# Patient Record
(demographics unavailable — no encounter records)

---

## 2024-10-21 NOTE — REASON FOR VISIT
[Follow-Up] : a follow-up visit [Home] : at home, [unfilled] , at the time of the visit. [Medical Office: ([facility])___] : at the medical office located in  [Patient] : the patient [Self] : self [This encounter was initiated by telehealth (audio with video) and converted to telephone (audio only) due to technical difficulties.] : This encounter was initiated by telehealth (audio with video) and converted to telephone (audio only) due to technical difficulties.

## 2024-10-21 NOTE — ASSESSMENT
[FreeTextEntry1] :  Baratric surgery history: none  Obesity co-morbities: HTN, HLD, GERD, Arthritis  Comorbidities improved or resolved:   Anti-obesity Medications: Bupropion  Obesity medication side effects: none . 77 year old female w/class 1 obesity now overweight status, htn, hld, GERD, arthritis presents for obesity medicine follow up visit.   Plan:.  - whole food eating approach-plant forward. Limit added fat/refined carbs. 3 meals/day. Reduce nighttime snacking, recognize food triggers.  -Adequate hydration. Now limiting alcohol, likely helping with wt loss.  -continue Bupropion 300 mg daily.  -food journal/mindful eating/stress management -continue regular physical activity>increase walking as tolerated -eventual TKR for arthritis but symptoms improved w/wt loss so will hold off for now.    RTC in 2 months

## 2024-11-08 NOTE — ASSESSMENT
[FreeTextEntry1] : Ms. ESPINOSA is a 77 year old female with a history of anxiety/depression, arthritis, hyperlipidemia, HTN, mitral valve prolapse, former 30+ pack year smoker- abnormal CT of chest (Stable study since 2017) -stable - Her #1 issue is arthritis (Still)- s/p Fx rib left- stable/thriving with weight loss (35 lbs)  Problem 1: abnormal CT of chest ( 7 x 6 mm nodule - indeterminate) likely benign since 2017  - ?CA, ?Old tuberculosis, ?old scar from chicken pox / measles , ?other benign abnormalities  - follow up CT 4/2025 -not a Nodify candidate  - s/p blood work: Quantiferon Gold, Hypersensitivity panel, ACE level (wnl) CAT scans are the only radiological modality to identify abnormalities w/in the lungs with regards to nodules/masses/lymph nodes. Risks, benefits were reviewed in detail. The guidelines for abnormalities include follow up CT scans at various intervals which could range from 6 weeks to 1 year intervals. If there is a change for the worse then consideration for a biopsy will be considered if you are a candidate. Second opinion evaluation with a thoracic surgeon or an interventional radiologist could be offered.   Problem 2: Lung CA screening  - s/p CT scan - 4/2023; 4/2024- next 4/2025 Lung cancer screening is recommended for people between the ages of 55 and 80 with prior 30+ pack year smoking histories. There is irrefutable evidence for realization of lung cancer screening based on two large randomized control trials demonstrating relative reduction in lung cancer mortality for patients undergoing low-dose CT scanning. Risks and benefits reviewed with the patient.  Problem 3: Cardiac  -continue following up with Dr. Howell al.  Problem 4: abnormal PFTs (resolved) - s/p full PFTs- wnl   Problem 5: Out of shape/overweight (improved) -recommended internet exercise platforms: SafetyTat and Aerobic exercise for seniors  -Weight loss, exercise, and diet control were discussed and are highly encouraged. Treatment options were given such as, aqua therapy, and contacting a nutritionist. Recommended to use the elliptical, stationary bike, less use of treadmill. .   Problem 6: Health Maintenance/COVID19 Precautions: - S/p Covid 19 vaccine (Moderna) x3 - Clean your hands often. Wash your hands often with soap and water for at least 20 seconds, especially after blowing your nose, coughing, or sneezing, or having been in a public place. - If soap and water are not available, use a hand  that contains at least 60% alcohol. - To the extent possible, avoid touching high-touch surfaces in public places - elevator buttons, door handles, handrails, handshaking with people, etc. Use a tissue or your sleeve to cover your hand or finger if you must touch something. - Wash your hands after touching surfaces in public places. - Avoid touching your face, nose, eyes, etc. - Clean and disinfect your home to remove germs: practice routine cleaning of frequently touched surfaces (for example: tables, doorknobs, light switches, handles, desks, toilets, faucets, sinks & cell phones) - Avoid crowds, especially in poorly ventilated spaces. Your risk of exposure to respiratory viruses like COVID-19 may increase in crowded, closed-in settings with little air circulation if there are people in the crowd who are sick. All patients are recommended to practice social distancing and stay at least 6 feet away from others. - Avoid all non-essential travel including plane trips, and especially avoid embarking on cruise ships. -If COVID-19 is spreading in your community, take extra measures to put distance between yourself and other people to further reduce your risk of being exposed to this new virus. -Stay home as much as possible. - Consider ways of getting food brought to your house through family, social, or commercial networks -Be aware that the virus has been known to live in the air up to 3 hours post exposure, cardboard up to 24 hours post exposure, copper up to 4 hours post exposure, steel and plastic up to 2-3 days post exposure. Risk of transmission from these surfaces are affected by many variables. Immune Support Recommendations: -OTC Vitamin C 500mg BID  -OTC Quercetin 250-500mg BID  -OTC Zinc 75-100mg per day  -OTC Melatonin 1 or 2 mg a night  -OTC Vitamin D 1-4000mg per day  -OTC Tonic Water 8oz per day Asthma and COVID19: You need to make sure your asthma is under control. This often requires the use of inhaled corticosteroids (and sometimes oral corticosteroids). Inhaled corticosteroids do not likely reduce your immune system's ability to fight infections, but oral corticosteroids may. It is important to use the steps above to protect yourself to limit your exposure to any respiratory virus.  Problem 7: health maintenance -s/p influenza vaccine 2024 -recommended strep pneumonia vaccines after age 65: Prevnar-13 vaccine (completed), followed by Pneumo vaccine 23 one year following (completed) -recommended early intervention for URIs -recommended regular osteoporosis evaluations -recommended early dermatological evaluations -recommended after the age of 50 to the age of 70, colonoscopy every 5 years   Follow up in 3-4 months  -She is recommended to call with any changes, questions, or concerns.

## 2024-11-08 NOTE — HISTORY OF PRESENT ILLNESS
[TextBox_4] : Ms. ESPINOSA is a 77-year-old female presenting to the office today for a follow-up pulmonary evaluation. Her chief complaint is  -she notes losing weight (~35 lbs) -she notes knee pains despite weight loss. Dr. Maya said she can have surgery when she's ready -she notes arthritis in her feet and back pain. Her rheumatologist instructed her to continue taking Advil -she notes s/p EgD and colonoscopy 8/2024 due to dysphagia and drooling -she notes she was given Pantoprazole, and her Sx resolved. She now takes Pantoprazole intermittently -she notes that, when she got home from the EgD, she had significant soreness and marks on her neck and cheek. -she was told that, during the EgD, she had SOB due to having a small neck -she notes frequently waking up due to nocturia, with no issues falling back asleep -she notes exercising at the [facility] 4X per week  -she denies any headaches, nausea, emesis, fever, chills, sweats, chest pain, chest pressure, coughing, wheezing, palpitations, diarrhea, constipation, vertigo, myalgias, leg swelling, itchy eyes, itchy ears, heartburn, reflux, or sour taste in the mouth.

## 2024-11-08 NOTE — ADDENDUM
[FreeTextEntry1] : Documented by Karen Campbell acting as a scribe for Dr. Eddie Saunders on 11/08/2024. All medical record entries made by the Scribe were at my, Dr. Eddie Saunders's, direction and personally dictated by me on 11/08/2024. I have reviewed the chart and agree that the record accurately reflects my personal performance of the history, physical exam, assessment and plan. I have also personally directed, reviewed, and agree with the discharge instructions.

## 2024-11-08 NOTE — PHYSICAL EXAM
[No Acute Distress] : no acute distress [Normal Oropharynx] : normal oropharynx [III] : Mallampati Class: III [Normal Appearance] : normal appearance [No Neck Mass] : no neck mass [Normal Rate/Rhythm] : normal rate/rhythm [Normal S1, S2] : normal s1, s2 [No Murmurs] : no murmurs [No Resp Distress] : no resp distress [Clear to Auscultation Bilaterally] : clear to auscultation bilaterally [No Abnormalities] : no abnormalities [Benign] : benign [No Clubbing] : no clubbing [Normal Gait] : normal gait [No Cyanosis] : no cyanosis [No Edema] : no edema [FROM] : FROM [Normal Color/ Pigmentation] : normal color/ pigmentation [No Focal Deficits] : no focal deficits [Oriented x3] : oriented x3 [Normal Affect] : normal affect [TextBox_68] : I:E ratio 1:3; clear

## 2024-11-08 NOTE — PROCEDURE
[FreeTextEntry1] : PFTs revealed mild restrictive dysfunction; FEV1 was 1.67L, which is 65.7% of predicted; normal flow volume loop. PFTs were performed to evaluate for abnormal CT  FENO was 20; a normal value being less than 25 Fractional exhaled nitric oxide (FENO) is regarded as a simple, noninvasive method for assessing eosinophilic airway inflammation. Produced by a variety of cells within the lung, nitric oxide (NO) concentrations are generally low in healthy individuals. However, high concentrations of NO appear to be involved in nonspecific host defense mechanisms and chronic inflammatory diseases such as asthma. The American Thoracic Society (ATS) therefore has recommended using FENO to aid in the diagnosis and monitoring of eosinophilic airway inflammation and asthma, and for identifying steroid responsive individuals whose chronic respiratory symptoms may be caused by airway inflammation.

## 2024-12-05 NOTE — HEALTH RISK ASSESSMENT
[Very Good] : ~his/her~  mood as very good [Yes] : Yes [4 or more  times a week (4 pts)] : 4 or more  times a week (4 points) [1 or 2 (0 pts)] : 1 or 2 (0 points) [Never (0 pts)] : Never (0 points) [No] : In the past 12 months have you used drugs other than those required for medical reasons? No [Former] : Former [20 or more] : 20 or more [> 15 Years] : > 15 Years [NO] : No [Patient reported mammogram was normal] : Patient reported mammogram was normal [Patient reported PAP Smear was normal] : Patient reported PAP Smear was normal [Patient reported bone density results were abnormal] : Patient reported bone density results were abnormal [Patient reported colonoscopy was normal] : Patient reported colonoscopy was normal [HIV test declined] : HIV test declined [Hepatitis C test declined] : Hepatitis C test declined [None] : None [With Family] : lives with family [Retired] : retired [College] : College [] :  [# Of Children ___] : has [unfilled] children [Feels Safe at Home] : Feels safe at home [Fully functional (bathing, dressing, toileting, transferring, walking, feeding)] : Fully functional (bathing, dressing, toileting, transferring, walking, feeding) [Fully functional (using the telephone, shopping, preparing meals, housekeeping, doing laundry, using] : Fully functional and needs no help or supervision to perform IADLs (using the telephone, shopping, preparing meals, housekeeping, doing laundry, using transportation, managing medications and managing finances) [Smoke Detector] : smoke detector [Carbon Monoxide Detector] : carbon monoxide detector [Safety elements used in home] : safety elements used in home [Seat Belt] :  uses seat belt [Sunscreen] : uses sunscreen [With Patient/Caregiver] : , with patient/caregiver [Reviewed no changes] : Reviewed, no changes [Name: ___] : Health Care Proxy's Name: [unfilled]  [Relationship: ___] : Relationship: [unfilled] [Aggressive treatment] : aggressive treatment [Audit-CScore] : 4 [Change in mental status noted] : No change in mental status noted [Language] : denies difficulty with language [Behavior] : denies difficulty with behavior [Learning/Retaining New Information] : denies difficulty learning/retaining new information [Handling Complex Tasks] : denies difficulty handling complex tasks [Reasoning] : denies difficulty with reasoning [Spatial Ability and Orientation] : denies difficulty with spatial ability and orientation [Sexually Active] : not sexually active [High Risk Behavior] : no high risk behavior [Reports changes in hearing] : Reports no changes in hearing [Reports changes in vision] : Reports no changes in vision [Reports changes in dental health] : Reports no changes in dental health [MammogramDate] : 10/2024 [MammogramComments] : gyn follow up [PapSmearDate] : 01/2024 [PapSmearComments] : gyn follow up [BoneDensityDate] : 09/2023 [BoneDensityComments] : osteopenia, vit d/calcium advised [ColonoscopyDate] : 08/2023 [ColonoscopyComments] : due in 2028.  [de-identified] : dentist twice a year [AdvancecareDate] : 12/05/2024

## 2024-12-05 NOTE — ASSESSMENT
[FreeTextEntry1] : completed physical exam, blood work and urinalysis ordered today, will follow up accordingly. HCM: up to date vaccines up to date, Tdap deferred vitals stable, ECG with cardiology no acute health cocerns medication refill sent.

## 2024-12-05 NOTE — HISTORY OF PRESENT ILLNESS
[FreeTextEntry1] : comprehensive visit [de-identified] : Ms. Claudia Suazo is a 76 yo female presents today for annual comprehensive and labs. Pt reports today, overall is well, no acute new health concerns today.

## 2024-12-05 NOTE — PHYSICAL EXAM
[No Acute Distress] : no acute distress [Well Nourished] : well nourished [Supple] : supple [Clear to Auscultation] : lungs were clear to auscultation bilaterally [Normal S1, S2] : normal S1 and S2 [No Edema] : there was no peripheral edema [Non Tender] : non-tender [No CVA Tenderness] : no CVA  tenderness [No Spinal Tenderness] : no spinal tenderness [No Rash] : no rash [Normal Gait] : normal gait [Normal Affect] : the affect was normal [de-identified] : obese

## 2024-12-16 NOTE — HISTORY OF PRESENT ILLNESS
[Improved Health] : Improved health [Improved Mobility] : Improved mobility [FreeTextEntry1] : 77 year old female w/htn, hld, GERD, arthritis presents obesity medicine follow up.   Weight loss hx: Gradual weight gain over time. Has had some success in past w/WW but does not enjoy group sessions.   Current weight: 188 lbs (down 37 lbs in 9 months>nearly 20% of BW) Max weight: 230lbs.  Ht:5' 9''   Goals: Originally under 200s now goal is 180mg/dl. Feel better/improve health. Needs bilat TKR eventually   Interim hx: -wt stable since last visit.  -happy that she has been able to maintain as she has not been restricting during holiday season -more lunches and get togethers recently.  -has Moh's surgery, couldn't exercise for 2 weeks but now restarted and has increased to 5 days/week and really feels well with this.   -wants to get down a few more lbs after the holidays if she can.  -On Bupropion 300mg. Tolerating well/w/no side effects. -ultimately wants to get below 180lbs.       Overall pleased w/progress and remains motivated.

## 2024-12-16 NOTE — ASSESSMENT
[FreeTextEntry1] :  Baratric surgery history: none  Obesity co-morbities: HTN, HLD, GERD, Arthritis  Comorbidities improved or resolved:   Anti-obesity Medications: Bupropion  Obesity medication side effects: none . 77 year old female w/class 1 obesity now overweight status, htn, hld, GERD, arthritis presents for obesity medicine follow up visit.   Plan:.  - whole food eating approach-plant forward. Limit added fat/refined carbs. 3 meals/day. Reduce nighttime snacking, recognize food triggers.  -Adequate hydration. Now limiting alcohol, likely helping with wt loss.  -continue Bupropion 300 mg daily.  -food journal/mindful eating/stress management -continue regular [facility]  -eventual TKR for arthritis but symptoms improved w/wt loss so will hold off for now.    RTC in 2 months

## 2024-12-16 NOTE — REASON FOR VISIT
[Follow-Up] : a follow-up visit [Home] : at home, [unfilled] , at the time of the visit. [Medical Office: ([facility])___] : at the medical office located in  [Patient] : the patient [Self] : self [This encounter was initiated by telehealth (audio with video) and converted to telephone (audio only) due to technical difficulties.] : This encounter was initiated by telehealth (audio with video) and converted to telephone (audio only) due to technical difficulties.

## 2025-02-05 NOTE — PHYSICAL EXAM
[de-identified] : Constitutional: Well nourished , well developed and in no acute distress Psychiatric: Alert and oriented to time place and person.Appropriate affect . Skin: Head, neck, arms and lower extremities:no lesions or discoloration HEENT: Normocephalic, EOM intact, Nasal septum midline, Respiratory: Unlabored respirations,no audible wheezing ,no tachypnea, no cyanosis Cardiovascular: No leg swelling no ankle edema no JVD, pulse regular Vascular: No calf or thigh tenderness, Peripheral pulses: intact Lymphatics: No groin adenopathy,no lymphedema lower or upper extremities     o Left Hip Exam:   Inspection/Palpation : no tenderness, no swelling, no deformity Leg Lengths: equal Passive Range of Motion: flexion 105 degrees, internal 10 degrees with pain, external 70 degrees, abduction 45  degrees, adduction 30   degrees Strength: resisted hip flexion 5/5, resisted hip abduction 5/5 Motor Strength: Peroneals, EHL, and tibialis anterior 5/5 Reflexes: Patella 3+ R 2+L, Achilles 2+ R=L Skin: no erythema, no ecchymosis Sensation: sensation to light touch intact Vascular Exam: no edema, no cyanosis, dorsalis pedis artery pulse 2+, posterior tibial artery pulse 2+ [de-identified] : AP Pelvis and AP/Lateral views of the left hip taken today 02/05/2025 demonstrate joint space maintained.

## 2025-02-05 NOTE — HISTORY OF PRESENT ILLNESS
[de-identified] : KORY ESPINOSA is a 77-year-old female presenting to the office complaining of left hip pain. Patient presents ambulating independently. Patient reports pain for 1 month. Denies any trauma or injury. Patient complains of intermittent pain on the lateral aspect of her hip. Denies any groin or thigh pain. Provocation of pain by going up the stairs, twisting and putting on her shoes.  Denies any numbness or tingling of the lower extremities. She reports chronic episodic low-grade lumbar spine pain. Of note patient has a history of right hip bursitis, unsure of how it was treated. She is planning on TKR and wants to ensure the hip is okay prior to this.

## 2025-02-05 NOTE — DISCUSSION/SUMMARY
[de-identified] : 77-year-old female with early left hip OA. We discussed at length the nature of the patient's condition. We discussed all options and conservative measures, such as ice, NSAIDs, physical therapy, exercise limitations, and injections.   Follow up as needed   The patient understood and verbally agreed to the treatment plan. All of their questions were answered. The patient should call the office if they have any questions or experience worsening symptoms.

## 2025-02-05 NOTE — HISTORY OF PRESENT ILLNESS
[de-identified] : KORY ESPINOSA is a 77-year-old female presenting to the office complaining of left hip pain. Patient presents ambulating independently. Patient reports pain for 1 month. Denies any trauma or injury. Patient complains of intermittent pain on the lateral aspect of her hip. Denies any groin or thigh pain. Provocation of pain by going up the stairs, twisting and putting on her shoes.  Denies any numbness or tingling of the lower extremities. She reports chronic episodic low-grade lumbar spine pain. Of note patient has a history of right hip bursitis, unsure of how it was treated. She is planning on TKR and wants to ensure the hip is okay prior to this.

## 2025-02-05 NOTE — PHYSICAL EXAM
[de-identified] : Constitutional: Well nourished , well developed and in no acute distress Psychiatric: Alert and oriented to time place and person.Appropriate affect . Skin: Head, neck, arms and lower extremities:no lesions or discoloration HEENT: Normocephalic, EOM intact, Nasal septum midline, Respiratory: Unlabored respirations,no audible wheezing ,no tachypnea, no cyanosis Cardiovascular: No leg swelling no ankle edema no JVD, pulse regular Vascular: No calf or thigh tenderness, Peripheral pulses: intact Lymphatics: No groin adenopathy,no lymphedema lower or upper extremities     o Left Hip Exam:   Inspection/Palpation : no tenderness, no swelling, no deformity Leg Lengths: equal Passive Range of Motion: flexion 105 degrees, internal 10 degrees with pain, external 70 degrees, abduction 45  degrees, adduction 30   degrees Strength: resisted hip flexion 5/5, resisted hip abduction 5/5 Motor Strength: Peroneals, EHL, and tibialis anterior 5/5 Reflexes: Patella 3+ R 2+L, Achilles 2+ R=L Skin: no erythema, no ecchymosis Sensation: sensation to light touch intact Vascular Exam: no edema, no cyanosis, dorsalis pedis artery pulse 2+, posterior tibial artery pulse 2+ [de-identified] : AP Pelvis and AP/Lateral views of the left hip taken today 02/05/2025 demonstrate joint space maintained.

## 2025-02-05 NOTE — ADDENDUM
[FreeTextEntry1] : This note was written by Emy Giron on 02/05/2025 acting solely as a scribe for Dr. Seven Carson.    All medical record entries made by the Scribe were at my, Dr. Seven Carson, direction and personally dictated by me on 02/05/2025. I have personally reviewed the chart and agree that the record accurately reflects my personal performance of the history, physical exam, assessment and plan.

## 2025-02-05 NOTE — DISCUSSION/SUMMARY
[de-identified] : 77-year-old female with early left hip OA. We discussed at length the nature of the patient's condition. We discussed all options and conservative measures, such as ice, NSAIDs, physical therapy, exercise limitations, and injections.   Follow up as needed   The patient understood and verbally agreed to the treatment plan. All of their questions were answered. The patient should call the office if they have any questions or experience worsening symptoms.

## 2025-02-19 NOTE — ASSESSMENT
[FreeTextEntry1] :  Baratric surgery history: none  Obesity co-morbities: HTN, HLD, GERD, Arthritis  Comorbidities improved or resolved:   Anti-obesity Medications: Bupropion  Obesity medication side effects: none . 77 year old female w/class 1 obesity now overweight status, htn, hld, GERD, arthritis presents for obesity medicine follow up visit.   Plan:.  - whole food eating approach-plant forward. Limit added fat/refined carbs. 3 meals/day. Reduce nighttime snacking, recognize food triggers.  -Adequate hydration. Now limiting alcohol, likely helping with wt loss.  -Increase Bupropion 450 mg daily.  -food journal/mindful eating/stress management -continue regular physical activity>[facility].  -eventual TKR for arthritis but symptoms improved w/wt loss so will hold off for now.    RTC in 2 months

## 2025-02-19 NOTE — REASON FOR VISIT
[Follow-Up] : a follow-up visit [Home] : at home, [unfilled] , at the time of the visit. [Medical Office: ([facility])___] : at the medical office located in  [Patient] : the patient [Self] : self [This encounter was initiated by telehealth (audio with video) and converted to telephone (audio only) due to technical difficulties.] : This encounter was initiated by telehealth (audio with video) and converted to telephone (audio only) due to technical difficulties.

## 2025-02-19 NOTE — HISTORY OF PRESENT ILLNESS
[Improved Health] : Improved health [Improved Mobility] : Improved mobility [FreeTextEntry1] : 77 year old female w/htn, hld, GERD, arthritis presents obesity medicine follow up.   Weight loss hx: Gradual weight gain over time. Has had some success in past w/WW but does not enjoy group sessions.   Current weight: 186.8 lbs (down 38 lbs/nearly 20% of BW) Max weight: 230lbs.  Ht:5' 9''   Goals: Originally under 200s now goal is 180mg/dl. Feel better/improve health. Needs bilat TKR eventually   Interim hx: -wt stable/slightly down since last visit  -some increased hunger for snacks some evenings. Had cake in her house and had it 3 days in a row -eats consistent breakfast and lunch every day. Lunch: low fat cottage cheese, olives, pickles and rice cake. - -some more hip pain, unclear if arthritis vs referred pain from knee -[facility]. Does exercise, has new friends there. Does some light weights, avoids squatting too low due to knee pain. considering doing TKR soon.  -On Bupropion 300mg. Tolerating well/w/no side effects. -ultimately wants to get below 180lbs.   -celebrating 78th birthday this weekend.     Overall pleased w/progress and remains motivated.

## 2025-05-05 NOTE — HISTORY OF PRESENT ILLNESS
[Improved Health] : Improved health [Improved Mobility] : Improved mobility [FreeTextEntry1] : 78 year old female w/htn, hld, GERD, arthritis presents obesity medicine follow up.   Weight loss hx: Gradual weight gain over time. Has had some success in past w/WW but does not enjoy group sessions.   Current weight: 186 lbs (down 38 lbs/nearly 20% of BW) Max weight: 230lbs.  Ht:5' 9''   Goals: Originally under 200s now goal is 180mg/dl. Feel better/improve health. Needs bilat TKR eventually   Interim hx: -wt stable/slightly down since last visit. 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 back now -consistently getting to gym, able to do exercise.  -diet stable, would like to lose a little more weight overall.  -considering having TKR this fall. Doesn't want to wait until she gets much older.  -On Bupropion 450mg. Tolerating well/w/no side effects. -ultimately wants to get below 180lbs.       Overall pleased w/progress and remains motivated.

## 2025-05-05 NOTE — ASSESSMENT
[FreeTextEntry1] :  Baratric surgery history: none  Obesity co-morbities: HTN, HLD, GERD, Arthritis  Comorbidities improved or resolved:   Anti-obesity Medications: Bupropion  Obesity medication side effects: none . 78 year old female w/class 1 obesity now overweight status, htn, hld, GERD, arthritis presents for obesity medicine follow up visit.   Plan:.  - whole food eating approach-plant forward. Limit added fat/refined carbs. 3 meals/day. Reduce nighttime snacking, recognize food triggers.  -Adequate hydration. Now limiting alcohol, likely helping with wt loss.  -Can go back down to Bupropion 300 mg daily.  -Can trial Metformin 500mg daily>increase to 2 tabs if tolerating after 2 weeks. Reviewed side effects.  -food journal/mindful eating/stress management -continue regular physical [facility].  -eventual TKR for arthritis but symptoms improved w/wt loss so will hold off for now.    RTC in 2 months

## 2025-05-09 NOTE — HISTORY OF PRESENT ILLNESS
[TextBox_4] : Ms. ESPINOSA is a 78-year-old female presenting to the office today for a follow-up pulmonary evaluation. Her chief complaint is  -she notes feeling generally well -she notes exercising (stronger seniors)  -she notes vision is stable -she notes appetite is stable -she notes diet is good -she notes lower back pain  -she notes good quality of sleep -she notes sleeping for 8 hours  -she notes she is pending knee surgery in the fall  -she notes losing weight (35 lbs)  -she notes arthritis of her feet   -she denies any headaches, nausea, emesis, fever, chills, sweats, chest pain, chest pressure, coughing, wheezing, palpitations, diarrhea, constipation, dysphagia, vertigo, leg swelling, itchy eyes, itchy ears, heartburn, reflux, or sour taste in the mouth.

## 2025-05-09 NOTE — ASSESSMENT
[FreeTextEntry1] : Ms. ESPINOSA is a 78 year old female with a history of anxiety/depression, arthritis, hyperlipidemia, HTN, mitral valve prolapse, former 30+ pack year smoker- abnormal CT of chest (Stable study since 2017) -stable - Her #1 issue is arthritis (Still)- s/p Fx rib left- stable/thriving with weight loss (35 lbs) - stable in "stronger seniros" [facility]  Problem 1: abnormal CT of chest ( 7 x 6 mm nodule - indeterminate) likely benign since 2017  - ?CA, ?Old tuberculosis, ?old scar from chicken pox / measles , ?other benign abnormalities  - follow up CT 4/2025 - 4/2026 -not a Nodify candidate  - s/p blood work: Quantiferon Gold, Hypersensitivity panel, ACE level (wnl) CAT scans are the only radiological modality to identify abnormalities w/in the lungs with regards to nodules/masses/lymph nodes. Risks, benefits were reviewed in detail. The guidelines for abnormalities include follow up CT scans at various intervals which could range from 6 weeks to 1 year intervals. If there is a change for the worse then consideration for a biopsy will be considered if you are a candidate. Second opinion evaluation with a thoracic surgeon or an interventional radiologist could be offered.   Problem 2: Lung CA screening  - s/p CT scan - 4/2023; 4/2024, 4/2025 - 4/2026 Lung cancer screening is recommended for people between the ages of 55 and 80 with prior 30+ pack year smoking histories. There is irrefutable evidence for realization of lung cancer screening based on two large randomized control trials demonstrating relative reduction in lung cancer mortality for patients undergoing low-dose CT scanning. Risks and benefits reviewed with the patient.  Problem 3: Cardiac  -continue following up with Dr. Nielsen.  Problem 4: abnormal PFTs (resolved) - s/p full PFTs- wnl   Problem 5: Out of shape/overweight (improved) -recommended internet exercise platforms: [employer] and Aerobic exercise for seniors  -Weight loss, exercise, and diet control were discussed and are highly encouraged. Treatment options were given such as, aqua therapy, and contacting a nutritionist. Recommended to use the elliptical, stationary bike, less use of treadmill. .   Problem 6: Health Maintenance/COVID19 Precautions: - S/p Covid 19 vaccine (Moderna) x3 - Clean your hands often. Wash your hands often with soap and water for at least 20 seconds, especially after blowing your nose, coughing, or sneezing, or having been in a public place. - If soap and water are not available, use a hand  that contains at least 60% alcohol. - To the extent possible, avoid touching high-touch surfaces in public places - elevator buttons, door handles, handrails, handshaking with people, etc. Use a tissue or your sleeve to cover your hand or finger if you must touch something. - Wash your hands after touching surfaces in public places. - Avoid touching your face, nose, eyes, etc. - Clean and disinfect your home to remove germs: practice routine cleaning of frequently touched surfaces (for example: tables, doorknobs, light switches, handles, desks, toilets, faucets, sinks & cell phones) - Avoid crowds, especially in poorly ventilated spaces. Your risk of exposure to respiratory viruses like COVID-19 may increase in crowded, closed-in settings with little air circulation if there are people in the crowd who are sick. All patients are recommended to practice social distancing and stay at least 6 feet away from others. - Avoid all non-essential travel including plane trips, and especially avoid embarking on cruise ships. -If COVID-19 is spreading in your community, take extra measures to put distance between yourself and other people to further reduce your risk of being exposed to this new virus. -Stay home as much as possible. - Consider ways of getting food brought to your house through family, social, or commercial networks -Be aware that the virus has been known to live in the air up to 3 hours post exposure, cardboard up to 24 hours post exposure, copper up to 4 hours post exposure, steel and plastic up to 2-3 days post exposure. Risk of transmission from these surfaces are affected by many variables. Immune Support Recommendations: -OTC Vitamin C 500mg BID  -OTC Quercetin 250-500mg BID  -OTC Zinc 75-100mg per day  -OTC Melatonin 1 or 2 mg a night  -OTC Vitamin D 1-4000mg per day  -OTC Tonic Water 8oz per day Asthma and COVID19: You need to make sure your asthma is under control. This often requires the use of inhaled corticosteroids (and sometimes oral corticosteroids). Inhaled corticosteroids do not likely reduce your immune system's ability to fight infections, but oral corticosteroids may. It is important to use the steps above to protect yourself to limit your exposure to any respiratory virus.  Problem 7: health maintenance -s/p influenza vaccine 2024 -recommended strep pneumonia vaccines after age 65: Prevnar-13 vaccine (completed), followed by Pneumo vaccine 23 one year following (completed) -recommended early intervention for URIs -recommended regular osteoporosis evaluations -recommended early dermatological evaluations -recommended after the age of 50 to the age of 70, colonoscopy every 5 years   Follow up in 3-4 months  -She is recommended to call with any changes, questions, or concerns.

## 2025-05-09 NOTE — PROCEDURE
[FreeTextEntry1] : Full PFT reveals normal flows; FEV1 was  2.09L which is 88% of predicted; normal lung volumes; normal diffusion at 21.92, which is 105% of predicted; normal flow volume loop. PFTs were performed to evaluate for SOB   FENO was 25; a normal value being less than 25 Fractional exhaled nitric oxide (FENO) is regarded as a simple, noninvasive method for assessing eosinophilic airway inflammation. Produced by a variety of cells within the lung, nitric oxide (NO) concentrations are generally low in healthy individuals. However, high concentrations of NO appear to be involved in nonspecific host defense mechanisms and chronic inflammatory diseases such as asthma. The American Thoracic Society (ATS) therefore has recommended using FENO to aid in the diagnosis and monitoring of eosinophilic airway inflammation and asthma, and for identifying steroid responsive individuals whose chronic respiratory symptoms may be caused by airway inflammation.   The American Thoracic Society (ATS) strongly recommends the use of FeNO measurement to aid in the assessment, management, and long-term monitoring of asthma. In their 2011 clinical practice guideline, the ATS emphasizes the importance of using FeNO.